# Patient Record
Sex: FEMALE | Race: WHITE
[De-identification: names, ages, dates, MRNs, and addresses within clinical notes are randomized per-mention and may not be internally consistent; named-entity substitution may affect disease eponyms.]

---

## 2019-03-15 ENCOUNTER — HOSPITAL ENCOUNTER (OUTPATIENT)
Dept: HOSPITAL 47 - RADMAMWWP | Age: 44
Discharge: HOME | End: 2019-03-15
Payer: COMMERCIAL

## 2019-03-15 DIAGNOSIS — Z12.31: Primary | ICD-10-CM

## 2019-03-15 PROCEDURE — 77067 SCR MAMMO BI INCL CAD: CPT

## 2019-03-15 PROCEDURE — 77063 BREAST TOMOSYNTHESIS BI: CPT

## 2019-03-19 NOTE — MM
Reason for exam: screening  (asymptomatic).

Last mammogram was performed 1 year and 8 months ago.



History:

Family history of breast cancer in 2 paternal aunts.



Physical Findings:

A clinical breast exam by your physician is recommended on an annual basis and 

results should be correlated with mammographic findings.



MG 3D Screening Mammo W/Cad

Bilateral CC and MLO view(s) were taken.

Prior study comparison: July 26, 2017, mammogram, performed at [facility]. 

September 9, 2013, mammogram, performed at [facility].

The breast tissue is heterogeneously dense. This may lower the sensitivity of 

mammography.  Focal asymmetry centrally posterior right breast appears more 

defined.





ASSESSMENT: Incomplete: need additional imaging evaluation, BI-RAD 0



RECOMMENDATION:

Special view mammogram of the right breast.



If lesion persists on supplemental views, image directed ultrasound is 

recommended.



[facility] will attempt to contact patient to return for supplemental 

views and ultrasound if indicated.

## 2019-03-22 ENCOUNTER — HOSPITAL ENCOUNTER (OUTPATIENT)
Dept: HOSPITAL 47 - RADMAMWWP | Age: 44
Discharge: HOME | End: 2019-03-22
Payer: COMMERCIAL

## 2019-03-22 DIAGNOSIS — R92.8: Primary | ICD-10-CM

## 2019-03-22 PROCEDURE — 77065 DX MAMMO INCL CAD UNI: CPT

## 2019-03-22 PROCEDURE — 77061 BREAST TOMOSYNTHESIS UNI: CPT

## 2019-03-25 NOTE — MM
Reason for exam: additional evaluation requested from abnormal screening.

Last mammogram was performed less than 1 month ago.



History:

Family history of breast cancer in 2 paternal aunts.



Physical Findings:

Nurse did not find any significant physical abnormalities on exam.



MG 3D Work Up W/Cad RT

Spot compression CC, spot compression MLO, and LM view(s) were taken of the right 

breast.

Prior study comparison: March 15, 2019, bilateral MG 3d screening mammo w/cad.  

July 26, 2017, mammogram, performed at [facility].

The breast tissue is heterogeneously dense. This may lower the sensitivity of 

mammography.  There is no discrete abnormality. No distinct lesion persists.



These results were verbally communicated with the patient and result sheet given 

to the patient on 3/22/19.





ASSESSMENT: Negative, BI-RAD 1



RECOMMENDATION:

Return to routine screening mammogram schedule for both breasts.

## 2022-07-14 ENCOUNTER — HOSPITAL ENCOUNTER (OUTPATIENT)
Dept: HOSPITAL 47 - SLEEP | Age: 47
Discharge: HOME | End: 2022-07-14
Attending: INTERNAL MEDICINE
Payer: COMMERCIAL

## 2022-07-14 DIAGNOSIS — I10: ICD-10-CM

## 2022-07-14 DIAGNOSIS — E66.9: ICD-10-CM

## 2022-07-14 DIAGNOSIS — R06.83: ICD-10-CM

## 2022-07-14 DIAGNOSIS — K21.9: ICD-10-CM

## 2022-07-14 DIAGNOSIS — G25.81: ICD-10-CM

## 2022-07-14 DIAGNOSIS — G47.33: Primary | ICD-10-CM

## 2022-07-14 DIAGNOSIS — Z98.890: ICD-10-CM

## 2022-07-14 DIAGNOSIS — E11.9: ICD-10-CM

## 2022-07-14 PROCEDURE — 99202 OFFICE O/P NEW SF 15 MIN: CPT
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
## 2024-10-28 ENCOUNTER — HOSPITAL ENCOUNTER (OUTPATIENT)
Dept: HOSPITAL 47 - RADFLMAIN | Age: 49
Discharge: HOME | End: 2024-10-28
Attending: INTERNAL MEDICINE
Payer: COMMERCIAL

## 2024-10-28 DIAGNOSIS — K21.9: Primary | ICD-10-CM

## 2024-10-28 PROCEDURE — 74220 X-RAY XM ESOPHAGUS 1CNTRST: CPT
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
